# Patient Record
Sex: MALE | Race: BLACK OR AFRICAN AMERICAN | NOT HISPANIC OR LATINO | Employment: FULL TIME | URBAN - METROPOLITAN AREA
[De-identification: names, ages, dates, MRNs, and addresses within clinical notes are randomized per-mention and may not be internally consistent; named-entity substitution may affect disease eponyms.]

---

## 2021-11-12 ENCOUNTER — APPOINTMENT (OUTPATIENT)
Dept: GENERAL RADIOLOGY | Facility: HOSPITAL | Age: 31
End: 2021-11-12

## 2021-11-12 ENCOUNTER — HOSPITAL ENCOUNTER (EMERGENCY)
Facility: HOSPITAL | Age: 31
Discharge: HOME | End: 2021-11-12
Attending: EMERGENCY MEDICINE | Admitting: EMERGENCY MEDICINE

## 2021-11-12 VITALS
SYSTOLIC BLOOD PRESSURE: 161 MMHG | RESPIRATION RATE: 18 BRPM | TEMPERATURE: 97.1 F | HEIGHT: 68 IN | DIASTOLIC BLOOD PRESSURE: 94 MMHG | BODY MASS INDEX: 39.4 KG/M2 | HEART RATE: 91 BPM | WEIGHT: 260 LBS | OXYGEN SATURATION: 100 %

## 2021-11-12 DIAGNOSIS — S50.01XA CONTUSION OF RIGHT ELBOW, INITIAL ENCOUNTER: Primary | ICD-10-CM

## 2021-11-12 PROCEDURE — 99283 EMERGENCY DEPT VISIT LOW MDM: CPT

## 2021-11-12 PROCEDURE — 73070 X-RAY EXAM OF ELBOW: CPT

## 2021-11-12 RX ORDER — ALBUTEROL SULFATE 2.5 MG/3ML
2.5 SOLUTION RESPIRATORY (INHALATION) EVERY 4 HOURS PRN
COMMUNITY

## 2021-11-12 RX ORDER — HYDROCODONE BITARTRATE AND ACETAMINOPHEN 7.5; 325 MG/1; MG/1
1 TABLET ORAL ONCE
Status: COMPLETED | OUTPATIENT
Start: 2021-11-12 | End: 2021-11-12

## 2021-11-12 RX ORDER — MELOXICAM 15 MG/1
15 TABLET ORAL DAILY
Qty: 20 TABLET | Refills: 0 | Status: SHIPPED | OUTPATIENT
Start: 2021-11-12

## 2021-11-12 RX ADMIN — HYDROCODONE BITARTRATE AND ACETAMINOPHEN 1 TABLET: 7.5; 325 TABLET ORAL at 18:30
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
## 2021-11-12 NOTE — DISCHARGE INSTRUCTIONS
Your x ray did no show an acute fracture of your elbow, it did show that an age indeterminate chip fracture to the ulna could not be excluded  Medications as ordered  Wear sling for comfort until follow up with orthopedic md or pain is better  Ice to painful areas for 20 minutes at a time, 4 times a day  Elevated to help reduce swelling  Tylenol or Motrin as needed for mild-moderate pain-take as instructed on package  Follow up with orthopedic MD in 3-5 days if symptoms not improving, call to schedule an appointment  Return to er for any worsening or new concerns including increased pain or swelling
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
## 2021-11-15 ENCOUNTER — NURSE TRIAGE (OUTPATIENT)
Dept: CALL CENTER | Facility: HOSPITAL | Age: 31
End: 2021-11-15
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
## 2021-11-18 ENCOUNTER — OFFICE VISIT (OUTPATIENT)
Dept: ORTHOPEDIC SURGERY | Facility: CLINIC | Age: 31
End: 2021-11-18

## 2021-11-18 VITALS — BODY MASS INDEX: 42.44 KG/M2 | WEIGHT: 280 LBS | TEMPERATURE: 96.6 F | HEIGHT: 68 IN

## 2021-11-18 DIAGNOSIS — S52.124A CLOSED NONDISPLACED FRACTURE OF HEAD OF RIGHT RADIUS, INITIAL ENCOUNTER: ICD-10-CM

## 2021-11-18 DIAGNOSIS — M25.521 ELBOW PAIN, RIGHT: Primary | ICD-10-CM

## 2021-11-18 PROCEDURE — 99203 OFFICE O/P NEW LOW 30 MIN: CPT | Performed by: NURSE PRACTITIONER

## 2021-11-18 PROCEDURE — 73070 X-RAY EXAM OF ELBOW: CPT | Performed by: NURSE PRACTITIONER

## 2021-11-18 NOTE — PATIENT INSTRUCTIONS
Radial Fracture Rehab  Ask your health care provider which exercises are safe for you. Do exercises exactly as told by your health care provider and adjust them as directed. It is normal to feel mild stretching, pulling, tightness, or discomfort as you do these exercises. Stop right away if you feel sudden pain or your pain gets worse. Do not begin these exercises until told by your health care provider.  Stretching and range-of-motion exercises  These exercises warm up your muscles and joints and improve the movement and flexibility of your wrist and forearm. These exercises also help to relieve pain, numbness, and tingling.  Wrist flexion  1. Bend your left / right elbow to a 90-degree angle (right angle) with your palm facing the floor.  2. Bend your wrist forward so your fingers point toward the floor (flexion).  3. Hold this position for __________ seconds.  4. Slowly return to the starting position.  Repeat __________ times. Complete this exercise __________ times a day.  Wrist extension  1. Bend your left / right elbow to a 90-degree angle (right angle) with your palm facing the floor.  2. Bend your wrist backward so your fingers point toward the ceiling (extension).  3. Hold this position for __________ seconds.  4. Slowly return to the starting position.  Repeat __________ times. Complete this exercise __________ times a day.  Ulnar deviation  1. Bend your left / right elbow to a 90-degree angle (right angle), and rest your forearm on a table with your palm facing down.  2. Keeping your hand flat on the table, bend your left / right wrist toward your small finger (pinkie). This is ulnar deviation.  3. Hold this position for __________ seconds.  4. Slowly return to the starting position.  Repeat __________ times. Complete this exercise __________ times a day.  Radial deviation  1. Bend your left / right elbow to a 90-degree angle (right angle), and rest your forearm on a table with your palm facing  down.  2. Keeping your hand flat on the table, bend your left / right wrist toward your thumb. This is radial deviation.  3. Hold this position for __________ seconds.  4. Slowly return to the starting position.  Repeat __________ times. Complete this exercise __________ times a day.  Forearm rotation, supination  1. Stand or sit with your left / right elbow bent to a 90-degree angle (right angle) at your side. Position your forearm so that the thumb is facing the ceiling (neutral position).  2. Turn (rotate) your palm up toward the ceiling (supination), stopping when you feel a gentle stretch.  3. Hold this position for __________ seconds.  4. Slowly return to the starting position.  Repeat __________ times. Complete this exercise __________ times a day.  Forearm rotation, pronation    1. Stand or sit with your left / right elbow bent to a 90-degree angle (right angle) at your side. Position your forearm so that the thumb is facing the ceiling (neutral position).  2. Rotate your palm down toward the floor (pronation), stopping when you feel a gentle stretch.  3. Hold this position for __________ seconds.  4. Slowly return to the starting position.  Repeat __________ times. Complete this exercise __________ times a day.  Wrist flexion, assisted    1. Extend your left / right arm in front of you, and point your palm down toward the floor.  ? If told by your health care provider, bend your left / right elbow to a 90-degree angle (right angle) at your side.  2. Using your uninjured hand, gently press over the back of your left / right hand (assisted) to bend your wrist and fingers toward the floor (flexion). Go as far as you can to feel a stretch without causing pain.  3. Hold this position for __________ seconds.  4. Slowly return to the starting position.  Repeat __________ times. Complete this exercise __________ times a day.  Wrist extension, assisted    1. Extend your left / right arm in front of you, and point  your palm up toward the ceiling.  ? If told by your health care provider, bend your left / right elbow to a 90-degree angle (right angle) at your side.  2. Using your uninjured hand, gently press over the palm of your left / right hand (assisted) to bend your wrist and fingers toward the floor (extension). Go as far as you can to feel a stretch without causing pain.  3. Hold this position for __________ seconds.  4. Slowly return to the starting position.  Repeat __________ times. Complete this exercise __________ times a day.  Assisted forearm rotation, supination  1. Stand or sit with your arms at your sides.  2. Bend your left / right elbow to a 90-degree angle (right angle).  3. Using your uninjured hand, turn your left / right palm up toward the ceiling (assisted supination) until you feel a gentle stretch in the inside of your forearm.  4. Hold this position for __________ seconds.  5. Slowly return to the starting position.  Repeat __________ times. Complete this exercise __________ times a day.  Assisted forearm rotation, pronation  1. Stand or sit with your arms at your sides.  2. Bend your left / right elbow to a 90-degree angle (right angle).  3. Using your uninjured hand, turn your left / right palm down toward the floor (assisted pronation) until you feel a gentle stretch in the top of your forearm.  4. Hold this position for __________ seconds.  5. Slowly return to the starting position.  Repeat __________ times. Complete this exercise __________ times a day.  Strengthening exercises  These exercises build strength and endurance in your forearm. Endurance is the ability to use your muscles for a long time, even after they get tired.  Wrist flexion  1. Sit with your left / right forearm supported on a table. Your elbow should be at waist height.  2. Rest your hand over the edge of the table, palm up.  3. Gently grasp a __________ lb (kg) weight. Or, hold an exercise band or tube in both hands, keeping  your hands at the same level and hip distance apart. There should be slight tension in the exercise band or tube.  4. Without moving your forearm or elbow, slowly bend your wrist up toward the ceiling (wrist flexion).  5. Hold this position for __________ seconds.  6. Slowly return to the starting position.  Repeat __________ times. Complete this exercise __________ times a day.  Wrist extension  1. Sit with your left / right forearm supported on a table. Your elbow should be at waist height.  2. Rest your hand over the edge of the table, palm down.  3. Gently grasp a __________ lb (kg) weight. Or, hold an exercise band or tube in both hands, keeping your hands at the same level and hip distance apart. There should be slight tension in the exercise band or tube.  4. Without moving your forearm or elbow, slowly curl your hand up toward the ceiling (extension).  5. Hold this position for __________ seconds.  6. Slowly return to the starting position.  Repeat __________ times. Complete this exercise __________ times a day.  Forearm rotation, supination    1. Sit with your left / right forearm supported on a table. Your elbow should be at waist height.  2. Rest your hand over the edge of the table, palm down.  3. Gently grasp a lightweight hammer near the head. As this exercise gets easier for you, try holding the hammer farther down the handle.  4. Without moving your elbow, slowly rotate your palm up toward the ceiling (supination).  5. Hold this position for __________ seconds.  6. Slowly return to the starting position.  Repeat __________ times. Complete this exercise __________ times a day.  Forearm rotation, pronation    1. Sit with your left / right forearm supported on a table. Your elbow should be at waist height.  2. Rest your hand over the edge of the table, palm up.  3. Gently grasp a lightweight hammer near the head. As this exercise gets easier for you, try holding the hammer farther down the  handle.  4. Without moving your elbow, slowly rotate your palm down toward the floor (pronation).  5. Hold this position for __________ seconds.  6. Slowly return to the starting position.  Repeat __________ times. Complete this exercise __________ times a day.      1. Hold one of these items in your left / right hand: a dense sponge, a stress ball, or a large, rolled sock.  2. Slowly squeeze the object as hard as you can without increasing any pain.  3. Hold your squeeze for __________ seconds.  4. Slowly release your .  Repeat __________ times. Complete this exercise __________ times a day.  This information is not intended to replace advice given to you by your health care provider. Make sure you discuss any questions you have with your health care provider.  Document Revised: 04/10/2020 Document Reviewed: 03/31/2020  Elsevier Patient Education © 2021 [employer]  Radial Head Fracture    A radial head fracture is a break in the smaller bone in your forearm (radius). The break happens near the end of the bone at the elbow joint. There are two bones in your forearm. The radius, or radial bone, is the bone on the side of your thumb.  There are different types of radial head fractures. The type depends on how much the bones have moved (been displaced) from their normal positions:  · Type 1. This is a small fracture in which the bone pieces stay together (nondisplaced fracture).  · Type 2. The fracture has bone pieces that are slightly moved.  · Type 3. There are many fractures and moved bone pieces.  What are the causes?  This condition is often caused by falling and landing on an outstretched arm.  What increases the risk?  You are more likely to get this condition if you:  · Are female.  · Are 30-40 years old.  · Have weak bones (osteoporosis).  What are the signs or symptoms?  · Swelling of the elbow joint.  · Pain and trouble moving the elbow joint.  How is this treated?  Treatment for this condition  may include:  · Resting your arm.  · Icing your arm.  · Raising (elevating) the injured area above the level of your heart.  · Taking medicines to help with the pain.  Treatment depends on the type of fracture you have.  · For type 1, you may be given a splint or sling to keep your arm and elbow from moving for several days.  · For type 2, you may be given a splint or sling to keep your arm and elbow from moving for several days. If the bones have moved a lot, you may need surgery.  · For type 3, you will often need surgery to have bone pieces removed. The entire radial head may need to be removed if the damage is very bad.  Follow these instructions at home:  Medicines  · Take over-the-counter and prescription medicines only as told by your doctor.  · Ask your doctor if the medicine prescribed to you:  ? Requires you to avoid driving or using heavy machinery.  ? Can cause trouble pooping (constipation). You may need to take these actions to prevent or treat trouble pooping:  § Drink enough fluid to keep your pee (urine) pale yellow.  § Take over-the-counter or prescription medicines.  § Eat foods that are high in fiber. These include beans, whole grains, and fresh fruits and vegetables.  § Limit foods that are high in fat and sugar. These include fried or sweet foods.  If you have a splint or sling:  · Wear the splint or sling as told by your doctor. Remove it only as told by your doctor.  · Loosen it if your fingers:  ? Tingle.  ? Become numb.  ? Turn cold and blue.  · Keep it clean and dry.  If you have a splint:  · Do not put pressure on any part of the splint until it is fully hardened. This may take several hours.  · Check the skin around the splint every day. Tell your doctor about any concerns.  Bathing  · Do not take baths, swim, or use a hot tub until your doctor approves. Ask your doctor if you may take showers. You may only be allowed to take sponge baths.  · If the splint or sling is not  waterproof:  ? Do not let it get wet.  ? Cover it with a watertight covering when you take a bath or shower.  Managing pain, stiffness, and swelling    · If told, put ice on the injured area.  ? If you have a removable splint or sling, remove it as told by your doctor.  ? Put ice in a plastic bag.  ? Place a towel between your skin and the bag.  ? Leave the ice on for 20 minutes, 2-3 times a day.  · Move your fingers often.  · Raise the injured area above the level of your heart while you are sitting or lying down.    Activity  · Ask your doctor when it is safe to drive if you have a splint or sling on your arm.  · Do not lift anything that is heavier than 10 lb (4.5 kg), or the limit that you are told, until your doctor says that it is safe.  · Return to your normal activities as told by your doctor. Ask your doctor what activities are safe for you.  · Do exercises as told by your doctor or physical therapist.  General instructions  · Do not use any products that contain nicotine or tobacco, such as cigarettes, e-cigarettes, and chewing tobacco. If you need help quitting, ask your doctor.  · Keep all follow-up visits as told by your doctor. This is important.  Contact a doctor if:  · You have problems with your splint.  · You have pain or swelling that gets worse.  Get help right away if:  · You have very bad pain.  · You have fluid or a bad smell coming from your splint.  · Your hand or fingers get cold or turn pale or blue.  · You lose feeling in any part of your hand or arm.  Summary  · A radial head fracture is a break in the smaller bone in your forearm (radius). The break happens near the end of the bone at the elbow joint.  · This break is often caused by falling and landing on an outstretched arm.  · This condition may be treated with rest, ice, raising the arm, pain medicines, a splint or sling, and surgery. Treatment depends on the type of fracture you have.  This information is not intended to replace  advice given to you by your health care provider. Make sure you discuss any questions you have with your health care provider.  Document Revised: 12/26/2019 Document Reviewed: 12/26/2019  Elsevier Patient Education © 2021 [employer]
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
## 2021-12-09 ENCOUNTER — OFFICE VISIT (OUTPATIENT)
Dept: ORTHOPEDIC SURGERY | Facility: CLINIC | Age: 31
End: 2021-12-09

## 2021-12-09 DIAGNOSIS — S52.124A CLOSED NONDISPLACED FRACTURE OF HEAD OF RIGHT RADIUS, INITIAL ENCOUNTER: ICD-10-CM

## 2021-12-09 DIAGNOSIS — R52 PAIN: Primary | ICD-10-CM

## 2021-12-09 DIAGNOSIS — M25.521 RIGHT ELBOW PAIN: ICD-10-CM

## 2021-12-09 PROCEDURE — 99213 OFFICE O/P EST LOW 20 MIN: CPT | Performed by: NURSE PRACTITIONER

## 2021-12-09 PROCEDURE — 73070 X-RAY EXAM OF ELBOW: CPT | Performed by: NURSE PRACTITIONER

## 2021-12-09 NOTE — PATIENT INSTRUCTIONS
Radial Head Elbow Fracture Rehab  Ask your health care provider which exercises are safe for you. Do exercises exactly as told by your health care provider and adjust them as directed. It is normal to feel mild stretching, pulling, tightness, or discomfort as you do these exercises. Stop right away if you feel sudden pain or your pain gets worse. Do not begin these exercises until told by your health care provider.  Range-of-motion exercises  These exercises warm up your muscles and joints and improve the movement and flexibility of your injured elbow. These exercises also help to relieve pain, numbness, and tingling. These exercises are done using the muscles in your injured elbow.  Elbow flexion and extension  1. Sit in a chair without armrests or stand with your left / right arm at your side. Start this exercise with your forearm at your side and your palm facing your body. Once instructed by your health care provider, also perform this exercise with:  ? Your forearm at your side and your palm facing forward.  ? Your forearm at your side and your palm facing backward.  2. Gently bend your left / right elbow to bring your hand toward your shoulder (flexion).  3. Gently straighten your left / right elbow (extension).  4. Avoid fully straightening or bending your elbow if:  ? It is painful or feels unstable.  ? Your health care provider told you not to do so.  Repeat __________ times. Complete this exercise __________ times a day.  Forearm rotation, supination  1. Sit with your left / right elbow bent to a 90-degree angle (right angle). Position your forearm so that the thumb is facing the ceiling (neutral position).  2. Gently turn (rotate) your palm up toward the ceiling (supination), stopping when you feel a gentle stretch.  3. Hold this position for __________ seconds.  4. Slowly release the stretch and return to the starting position.  Repeat __________ times. Complete this exercise __________ times a  day.  Forearm rotation, pronation    1. Sit with your left / right elbow bent to a 90-degree angle (right angle). Position your forearm so that the thumb is facing the ceiling (neutral position).  2. Gently turn (rotate) your palm down toward the floor (pronation), stopping when you feel a gentle stretch.  3. Hold this position for __________ seconds.  4. Slowly release the stretch and return to the starting position.  Repeat __________ times. Complete this exercise __________ times a day.  Stretching exercises  These exercises warm up your muscles and joints and improve the movement and flexibility of your injured elbow. These exercises also help to relieve pain, numbness, and tingling. These exercises are done using your healthy elbow to help stretch the muscles in your injured elbow.  Elbow flexion, assisted    1. Stand or sit with your left / right arm at your side.  2. Using your other hand, gently push your left / right arm toward your shoulder (assisted flexion). Bend your elbow as far as your health care provider tells you to.  3. Hold this position for __________ seconds.  4. Slowly return to the starting position.  Repeat __________ times. Complete this exercise __________ times a day.  Elbow extension, assisted    1. Stand or sit with your left / right elbow at your side and bent in a 90-degree angle (right angle). Position your forearm so that the thumb is facing the ceiling (neutral position).  2. Use your other hand to straighten the left / right elbow. To do this, gently push down on your forearm until you feel a gentle stretch on the inside of your elbow (assisted extension).  3. Hold this position for __________ seconds.  4. Slowly return to the starting position.  Repeat __________ times. Complete this exercise __________ times a day.  Assisted forearm rotation, supination  1. Stand or sit with your elbows at your sides.  2. Bend your left / right elbow to a 90-degree angle (right angle).  3. Using  your uninjured hand, turn your left / right palm up toward the ceiling (assisted supination) until you feel a gentle stretch along the inside of your forearm.  4. Hold this position for __________ seconds.  5. Slowly release the stretch and return to the starting position.  Repeat __________ times. Complete this exercise __________ times a day.  Assisted forearm rotation, pronation  1. Stand or sit with your arms at your sides.  2. Bend your left / right elbow to a 90-degree angle (right angle).  3. Using your uninjured hand, rotate your left / right palm down toward the floor (assisted pronation) until you feel a gentle stretch along the outside of your forearm.  4. Hold this position for __________ seconds.  5. Slowly release the stretch and return to the starting position.  Repeat __________ times. Complete this exercise __________ times a day.  Elbow flexion, supine    1. Lie on your back. This is the supine position.  2. Straighten your left / right arm up in the air, so your hand is pointing up toward the ceiling. Support your upper arm with your uninjured hand.  3. Bend your left / right elbow so your hand slowly lowers toward your opposite, uninjured, shoulder (flexion). Keep your elbow pointed toward the ceiling. You should feel a gentle stretch along the back of your upper arm. If told by your health care provider:  ? You may increase the intensity of your stretch by adding a small weight on your wrist or in your hand.  ? You may do the exercise with your forearm facing your body so that the thumb is facing the ceiling (neutral position).  ? You may do the exercise with your forearm in a palm-up position (supination).  4. Hold this position for __________ seconds.  5. Slowly return to the starting position by straightening your elbow.  Repeat __________ times. Complete this exercise __________ times a day.  Elbow extension, supine    1. Lie on your back (supine position) in a comfortable position that  allows you to relax your arm muscles.  2. Place a folded towel under your left / right upper arm so your elbow and shoulder are at the same height.  3. Straighten your left / right arm so your elbow does not rest on the bed or towel, keeping the palm of your hand facing the floor. Let the weight of your hand straighten your elbow (extension). You should feel a stretch on the inside of your elbow. If told by your health care provider:  ? You may increase the intensity of your stretch by adding a small weight on your wrist or in your hand.  ? You may do the exercise with your thumb facing the ceiling (neutral position).  ? You may do the exercise with your forearm in a palm-up position (supination).  4. Hold this position for __________ seconds.  5. Slowly return to the starting position.  Repeat __________ times. Complete this exercise __________ times a day.  Strengthening exercises  These exercises build strength and endurance in your elbow. Endurance is the ability to use your muscles for a long time, even after they get tired.  Isometric elbow flexion    1. Stand or sit with your left / right arm at waist height. Your palm should face in, toward your body.  2. Place your other hand on top of your left / right forearm. Gently push down while you resist with your left / right arm (isometric flexion). Push as hard as you are able to without pain.  ? Start with 25-50% effort and increase your effort as tolerated.  ? Do not let your left / right elbow move.  3. Hold this position for __________ seconds.  4. Slowly release the tension in both arms. Let your muscles relax completely before repeating.  Repeat __________ times. Complete this exercise __________ times a day.  Isometric elbow extension    1. Stand or sit with your left / right arm at waist height. Your palm should face in, toward your body.  2. Place your other hand on the bottom of your left / right forearm. Gently push up while you resist with your left /  right arm (isometric extension). Push as hard as you are able to without pain.  ? Start with 25-50% effort and increase your effort as tolerated.  ? Do not let your left / right elbow move.  3. Hold this position for __________ seconds.  4. Slowly release the tension in both arms. Let your muscles relax completely before repeating.  Repeat __________ times. Complete this exercise __________ times a day.  Forearm rotation, supination    1. Sit with your left / right forearm supported on a table. Bend your elbow to a 90-degree angle (right angle) at your side.  2. Rest your hand over the edge of the table, palm down. Keep your wrist stable. Do not allow it to bend backward or forward during the exercise.  3. Gently hold a lightweight hammer with your left / right hand.  ? This exercise will be easier if you hold the hammer near the head of the hammer.  ? This exercise will be harder if you hold the hammer near the end of the handle.  4. Without moving your elbow, slowly turn (rotate) your palm up toward the ceiling (supination).  5. Hold this position for __________ seconds.  6. Slowly return to the starting position.  Repeat __________ times. Complete this exercise __________ times a day.  Forearm rotation, pronation    1. Sit with your left / right forearm supported on a table. Bend your elbow to a 90-degree angle (right angle) at your side.  2. Rest your hand over the edge of the table, palm up. Keep your wrist stable. Do not allow it to bend backward or forward during the exercise.  3. Gently hold a lightweight hammer with your left / right hand.  ? This exercise will be easier if you hold the hammer near the head of the hammer.  ? This exercise will be harder if you hold the hammer near the end of the handle.  4. Without moving your elbow, slowly turn (rotate) your palm down toward the floor (pronation).  5. Hold this position for __________ seconds.  6. Slowly return to the starting position.  Repeat __________  times. Complete this exercise __________ times a day.  Biceps curls    1. Sit on a stable chair without armrests, or stand up.  2. Hold a __________ weight in your left / right hand, or hold an exercise band with both hands. Your palms should face upward.  3. Keeping your other arm straight, bend your left / right elbow and move your hand up toward your shoulder. Keep your elbow at your side while you bend it.  4. Slowly return to the starting position.  Repeat __________ times. Complete this exercise __________ times a day.  Elbow extension with exercise band or tube    1. Sit upright on a firm chair without armrests, or stand up.  2. Keep your upper arms at your sides, and bend both elbows to bring your hands up to your left / right shoulder while gripping an exercise band or tubing. Your left / right hand should be just below the other hand.  3. Slowly straighten your left / right elbow (elbow extension).  4. Hold this position for __________ seconds.  5. Slowly return your left / right hand to the starting position.  Repeat __________ times. Complete this exercise __________ times a day.  This information is not intended to replace advice given to you by your health care provider. Make sure you discuss any questions you have with your health care provider.  Document Revised: 07/23/2020 Document Reviewed: 04/07/2020  [employer]vier Patient Education © 2021 [employer]  Radial Head Fracture    A radial head fracture is a break in the smaller bone in your forearm (radius). The break happens near the end of the bone at the elbow joint. There are two bones in your forearm. The radius, or radial bone, is the bone on the side of your thumb.  There are different types of radial head fractures. The type depends on how much the bones have moved (been displaced) from their normal positions:  · Type 1. This is a small fracture in which the bone pieces stay together (nondisplaced fracture).  · Type 2. The fracture has bone  pieces that are slightly moved.  · Type 3. There are many fractures and moved bone pieces.  What are the causes?  This condition is often caused by falling and landing on an outstretched arm.  What increases the risk?  You are more likely to get this condition if you:  · Are female.  · Are 30-40 years old.  · Have weak bones (osteoporosis).  What are the signs or symptoms?  · Swelling of the elbow joint.  · Pain and trouble moving the elbow joint.  How is this treated?  Treatment for this condition may include:  · Resting your arm.  · Icing your arm.  · Raising (elevating) the injured area above the level of your heart.  · Taking medicines to help with the pain.  Treatment depends on the type of fracture you have.  · For type 1, you may be given a splint or sling to keep your arm and elbow from moving for several days.  · For type 2, you may be given a splint or sling to keep your arm and elbow from moving for several days. If the bones have moved a lot, you may need surgery.  · For type 3, you will often need surgery to have bone pieces removed. The entire radial head may need to be removed if the damage is very bad.  Follow these instructions at home:  Medicines  · Take over-the-counter and prescription medicines only as told by your doctor.  · Ask your doctor if the medicine prescribed to you:  ? Requires you to avoid driving or using heavy machinery.  ? Can cause trouble pooping (constipation). You may need to take these actions to prevent or treat trouble pooping:  § Drink enough fluid to keep your pee (urine) pale yellow.  § Take over-the-counter or prescription medicines.  § Eat foods that are high in fiber. These include beans, whole grains, and fresh fruits and vegetables.  § Limit foods that are high in fat and sugar. These include fried or sweet foods.  If you have a splint or sling:  · Wear the splint or sling as told by your doctor. Remove it only as told by your doctor.  · Loosen it if your  fingers:  ? Tingle.  ? Become numb.  ? Turn cold and blue.  · Keep it clean and dry.  If you have a splint:  · Do not put pressure on any part of the splint until it is fully hardened. This may take several hours.  · Check the skin around the splint every day. Tell your doctor about any concerns.  Bathing  · Do not take baths, swim, or use a hot tub until your doctor approves. Ask your doctor if you may take showers. You may only be allowed to take sponge baths.  · If the splint or sling is not waterproof:  ? Do not let it get wet.  ? Cover it with a watertight covering when you take a bath or shower.  Managing pain, stiffness, and swelling    · If told, put ice on the injured area.  ? If you have a removable splint or sling, remove it as told by your doctor.  ? Put ice in a plastic bag.  ? Place a towel between your skin and the bag.  ? Leave the ice on for 20 minutes, 2-3 times a day.  · Move your fingers often.  · Raise the injured area above the level of your heart while you are sitting or lying down.    Activity  · Ask your doctor when it is safe to drive if you have a splint or sling on your arm.  · Do not lift anything that is heavier than 10 lb (4.5 kg), or the limit that you are told, until your doctor says that it is safe.  · Return to your normal activities as told by your doctor. Ask your doctor what activities are safe for you.  · Do exercises as told by your doctor or physical therapist.  General instructions  · Do not use any products that contain nicotine or tobacco, such as cigarettes, e-cigarettes, and chewing tobacco. If you need help quitting, ask your doctor.  · Keep all follow-up visits as told by your doctor. This is important.  Contact a doctor if:  · You have problems with your splint.  · You have pain or swelling that gets worse.  Get help right away if:  · You have very bad pain.  · You have fluid or a bad smell coming from your splint.  · Your hand or fingers get cold or turn pale or  blue.  · You lose feeling in any part of your hand or arm.  Summary  · A radial head fracture is a break in the smaller bone in your forearm (radius). The break happens near the end of the bone at the elbow joint.  · This break is often caused by falling and landing on an outstretched arm.  · This condition may be treated with rest, ice, raising the arm, pain medicines, a splint or sling, and surgery. Treatment depends on the type of fracture you have.  This information is not intended to replace advice given to you by your health care provider. Make sure you discuss any questions you have with your health care provider.  Document Revised: 12/26/2019 Document Reviewed: 12/26/2019  [employer]vier Patient Education © 2021 [employer]

## 2021-12-09 NOTE — PROGRESS NOTES
Patient Name: Shaan Chadwick   YOB: 1990  Referring Primary Care Physician: Savannah Barney APRN  BMI: There is no height or weight on file to calculate BMI.    Chief Complaint:  No chief complaint on file.       HPI: Pt returns here for right elbow pain after injury on 11-5-21 and continues to have pain with lifting, pulling, pushing and is having trouble getting full extension. He has been doing home exercises and continues to have pain and swelling with full extension.     Shaan Chadwick is a 31 y.o. male who presents today for evaluation of No chief complaint on file.      This problem is new to this examiner.     Subjective   Medications:   Home Medications:  Current Outpatient Medications on File Prior to Visit   Medication Sig   • albuterol (PROVENTIL) (2.5 MG/3ML) 0.083% nebulizer solution Take 2.5 mg by nebulization Every 4 (Four) Hours As Needed for Wheezing.   • meloxicam (MOBIC) 15 MG tablet Take 1 tablet by mouth Daily.     No current facility-administered medications on file prior to visit.     Current Medications:  Scheduled Meds:  Continuous Infusions:No current facility-administered medications for this visit.    PRN Meds:.    I have reviewed the patient's medical history in detail and updated the computerized patient record.  Review and summarization of old records includes:    Past Medical History:   Diagnosis Date   • Hypertension       No past surgical history on file.     Social History     Occupational History   • Not on file   Tobacco Use   • Smoking status: Not on file   • Smokeless tobacco: Not on file   Substance and Sexual Activity   • Alcohol use: Not on file   • Drug use: Not on file   • Sexual activity: Not on file      Social History     Social History Narrative   • Not on file      No family history on file.    ROS: 14 point review of systems was performed and all other systems were reviewed and are negative except for documented findings in HPI and today's encounter.      Allergies: No Known Allergies  Constitutional:  Denies fever, shaking or chills   Eyes:  Denies change in visual acuity   HENT:  Denies nasal congestion or sore throat   Respiratory:  Denies cough or shortness of breath   Cardiovascular:  Denies chest pain or severe LE edema   GI:  Denies abdominal pain, nausea, vomiting, bloody stools or diarrhea   Musculoskeletal:  Numbness, tingling, pain, or loss of motor function only as noted above in history of present illness.  : Denies painful urination or hematuria  Integument:  Denies rash, lesion or ulceration   Neurologic:  Denies headache or focal weakness  Endocrine:  Denies lymphadenopathy  Psych:  Denies confusion or change in mental status   Hem:  Denies active bleeding    OBJECTIVE:  Physical Exam:   There were no vitals taken for this visit.    General Appearance:    Alert, cooperative, in no acute distress                  Eyes: conjunctiva clear  ENT: external ears and nose atraumatic  CV: no peripheral edema  Resp: normal respiratory effort  Skin: no rashes or wounds; normal turgor  Psych: mood and affect appropriate  Lymph: no nodes appreciated  Neuro: gross sensation intact  Vascular:  Palpable peripheral pulse in noted extremity  Musculoskeletal Extremities: skin warm dry and intact with tenderness to medial epicondyle with pt lacking full extension by 5 degress no deformity or masses     Radiology:   Reviewed from last visit     Assessment:     ICD-10-CM ICD-9-CM   1. Pain  R52 780.96   2. Right elbow pain  M25.521 719.42   3. Closed nondisplaced fracture of head of right radius, initial encounter  S52.124A 813.05        Procedures   will get MRI and evaluate and set up for physical therapy    Plan: The diagnosis(es), natural history, pathophysiology and treatment for diagnosis(es) were discussed. Opportunity given and questions answered.  Biomechanics of pertinent body areas discussed.  When appropriate, the use of ambulatory aids  discussed.  EXERCISES:  Advice on benefits of, and types of regular/moderate exercise pertaining to orthopedic diagnosis(es).  MEDICATIONS:  The risks, benefits, warnings,side effects and alternatives of medications discussed.  Inflammation/pain control; with cold, heat, elevation and/or liniments discussed as appropriate  PT referral.  HOME EXERCISE/PT program encouraged  MRI.      12/9/2021    Much of this encounter note is an electronic transcription/translation of spoken language to printed text. The electronic translation of spoken language may permit erroneous, or at times, nonsensical words or phrases to be inadvertently transcribed; Although I have reviewed the note for such errors, some may still exist

## 2021-12-13 ENCOUNTER — TELEPHONE (OUTPATIENT)
Dept: ORTHOPEDIC SURGERY | Facility: CLINIC | Age: 31
End: 2021-12-13

## 2021-12-13 NOTE — TELEPHONE ENCOUNTER
Provider: MAURO  Caller: NAMITA MORALES  Relationship to Patient: PATIENT  Pharmacy: N/A  Phone Number: [phone]  Reason for Call: PATIENT CALLING TO CHECK STATUS OF MRI ORDER  When was the patient last seen: 12.9.21